# Patient Record
Sex: FEMALE | Race: WHITE | NOT HISPANIC OR LATINO | Employment: OTHER | URBAN - NONMETROPOLITAN AREA
[De-identification: names, ages, dates, MRNs, and addresses within clinical notes are randomized per-mention and may not be internally consistent; named-entity substitution may affect disease eponyms.]

---

## 2024-10-21 ENCOUNTER — OFFICE VISIT (OUTPATIENT)
Dept: URGENT CARE | Facility: CLINIC | Age: 84
End: 2024-10-21
Payer: MEDICARE

## 2024-10-21 VITALS
HEART RATE: 71 BPM | HEIGHT: 61 IN | TEMPERATURE: 97.9 F | DIASTOLIC BLOOD PRESSURE: 70 MMHG | RESPIRATION RATE: 18 BRPM | SYSTOLIC BLOOD PRESSURE: 136 MMHG | WEIGHT: 124 LBS | BODY MASS INDEX: 23.41 KG/M2 | OXYGEN SATURATION: 100 %

## 2024-10-21 DIAGNOSIS — L56.4 POLYMORPHIC LIGHT ERUPTION: ICD-10-CM

## 2024-10-21 DIAGNOSIS — L20.9 ATOPIC DERMATITIS, UNSPECIFIED TYPE: ICD-10-CM

## 2024-10-21 PROCEDURE — 3075F SYST BP GE 130 - 139MM HG: CPT | Performed by: FAMILY MEDICINE

## 2024-10-21 PROCEDURE — 3078F DIAST BP <80 MM HG: CPT | Performed by: FAMILY MEDICINE

## 2024-10-21 PROCEDURE — 99203 OFFICE O/P NEW LOW 30 MIN: CPT | Performed by: FAMILY MEDICINE

## 2024-10-21 RX ORDER — CLOBETASOL PROPIONATE 0.5 MG/ML
SOLUTION TOPICAL
COMMUNITY
Start: 2024-08-29

## 2024-10-21 RX ORDER — LORATADINE 10 MG/1
10 CAPSULE, LIQUID FILLED ORAL DAILY
Qty: 30 CAPSULE | Refills: 1 | Status: SHIPPED | OUTPATIENT
Start: 2024-10-21

## 2024-10-21 RX ORDER — AMLODIPINE BESYLATE 2.5 MG/1
1 TABLET ORAL DAILY
COMMUNITY
Start: 2024-06-07

## 2024-10-21 RX ORDER — MIRTAZAPINE 15 MG/1
TABLET, FILM COATED ORAL
COMMUNITY
Start: 2024-07-12

## 2024-10-21 RX ORDER — PRAVASTATIN SODIUM 40 MG
1 TABLET ORAL DAILY
COMMUNITY
Start: 2024-09-23

## 2024-10-21 RX ORDER — LEVOTHYROXINE SODIUM 50 UG/1
TABLET ORAL
COMMUNITY
Start: 2024-05-01

## 2024-10-21 RX ORDER — ALENDRONATE SODIUM 70 MG/1
TABLET ORAL
COMMUNITY
Start: 2024-05-07

## 2024-10-21 RX ORDER — POLYETHYLENE GLYCOL 3350, SODIUM CHLORIDE, SODIUM BICARBONATE, POTASSIUM CHLORIDE 420; 11.2; 5.72; 1.48 G/4L; G/4L; G/4L; G/4L
POWDER, FOR SOLUTION ORAL
COMMUNITY
Start: 2024-08-02

## 2024-10-21 RX ORDER — DIPHENOXYLATE HYDROCHLORIDE AND ATROPINE SULFATE 2.5; .025 MG/1; MG/1
TABLET ORAL
COMMUNITY
Start: 2024-09-25

## 2024-10-21 RX ORDER — ONDANSETRON 4 MG/1
TABLET, ORALLY DISINTEGRATING ORAL
COMMUNITY
Start: 2024-08-02

## 2024-10-21 RX ORDER — DESOXIMETASONE 2.5 MG/G
CREAM TOPICAL
COMMUNITY
Start: 2024-09-23

## 2024-10-21 RX ORDER — TRIAMCINOLONE ACETONIDE 1 MG/G
1 CREAM TOPICAL 2 TIMES DAILY
Qty: 30 G | Refills: 1 | Status: SHIPPED | OUTPATIENT
Start: 2024-10-21 | End: 2024-11-04

## 2024-10-21 ASSESSMENT — ENCOUNTER SYMPTOMS
NAUSEA: 0
VOMITING: 0
SHORTNESS OF BREATH: 0
COUGH: 0
CHILLS: 0
FEVER: 0
DIZZINESS: 0
SORE THROAT: 0

## 2024-11-01 ENCOUNTER — OFFICE VISIT (OUTPATIENT)
Dept: URGENT CARE | Facility: CLINIC | Age: 84
End: 2024-11-01
Payer: MEDICARE

## 2024-11-01 ENCOUNTER — HOSPITAL ENCOUNTER (OUTPATIENT)
Facility: MEDICAL CENTER | Age: 84
End: 2024-11-01
Attending: NURSE PRACTITIONER
Payer: MEDICARE

## 2024-11-01 VITALS
HEIGHT: 61 IN | DIASTOLIC BLOOD PRESSURE: 70 MMHG | WEIGHT: 124 LBS | HEART RATE: 91 BPM | SYSTOLIC BLOOD PRESSURE: 124 MMHG | BODY MASS INDEX: 23.41 KG/M2 | OXYGEN SATURATION: 95 % | RESPIRATION RATE: 16 BRPM | TEMPERATURE: 97.9 F

## 2024-11-01 DIAGNOSIS — R39.9 UTI SYMPTOMS: ICD-10-CM

## 2024-11-01 DIAGNOSIS — N18.32 STAGE 3B CHRONIC KIDNEY DISEASE: ICD-10-CM

## 2024-11-01 LAB
APPEARANCE UR: CLEAR
BILIRUB UR STRIP-MCNC: NEGATIVE MG/DL
COLOR UR AUTO: YELLOW
GLUCOSE UR STRIP.AUTO-MCNC: NEGATIVE MG/DL
KETONES UR STRIP.AUTO-MCNC: 15 MG/DL
LEUKOCYTE ESTERASE UR QL STRIP.AUTO: NORMAL
NITRITE UR QL STRIP.AUTO: NEGATIVE
PH UR STRIP.AUTO: 5.5 [PH] (ref 5–8)
PROT UR QL STRIP: 100 MG/DL
RBC UR QL AUTO: NORMAL
SP GR UR STRIP.AUTO: 1.02
UROBILINOGEN UR STRIP-MCNC: 0.2 MG/DL

## 2024-11-01 PROCEDURE — 87086 URINE CULTURE/COLONY COUNT: CPT

## 2024-11-01 PROCEDURE — 3074F SYST BP LT 130 MM HG: CPT | Performed by: NURSE PRACTITIONER

## 2024-11-01 PROCEDURE — 81002 URINALYSIS NONAUTO W/O SCOPE: CPT | Performed by: NURSE PRACTITIONER

## 2024-11-01 PROCEDURE — 99214 OFFICE O/P EST MOD 30 MIN: CPT | Performed by: NURSE PRACTITIONER

## 2024-11-01 PROCEDURE — 3078F DIAST BP <80 MM HG: CPT | Performed by: NURSE PRACTITIONER

## 2024-11-01 RX ORDER — NITROFURANTOIN 25; 75 MG/1; MG/1
100 CAPSULE ORAL 2 TIMES DAILY
Qty: 10 CAPSULE | Refills: 0 | Status: SHIPPED | OUTPATIENT
Start: 2024-11-01 | End: 2024-11-06

## 2024-11-01 RX ORDER — CEFDINIR 300 MG/1
CAPSULE ORAL
COMMUNITY
Start: 2024-09-25

## 2024-11-01 NOTE — PROGRESS NOTES
Padmini Berrios is a 84 y.o. female who presents for UTI (Discomfort burning feeling, fever 103, x 1 week)      HPI  This is a new problem. Padmini Berrios is a 84 y.o. patient who presents to urgent care with c/o: Urinary tract symptoms for 1 week.  She has some burning with urination.  She has urgency to urinate.  She has had what frequency over the past few days.  She is visiting her daughter here in the area.  Her daughter notes that she has not drink as much fluids as she usually does or should.  Her daughter had some Macrobid antibiotics that is the same antibiotic that Padmini takes from her doctors when she gets a urinary tract infection and she gave her 1 pill last night and 1 again this morning.  Last night they measured a fever of 103 degrees.  She was treated with some Tylenol.  She has not had a fever since then.  She did felt really cold last night and required an extra blanket on her bed.  She does have a history of stage III kidney disease.  Her daughter reports that they have never had to adjust her medication and always treat her with Macrodantin.  Her last urinary tract infection was in October 2024.  Denies flank pain, nausea, vomiting, diarrhea, weakness.    ROS See HPI    Allergies:       Allergies   Allergen Reactions    Amitriptyline     Misc. Sulfonamide Containing Compounds     Sulfamethoxazole     Trimethoprim        PMSFS Hx:  No past medical history on file.  No past surgical history on file.  No family history on file.  Social History     Tobacco Use    Smoking status: Not on file    Smokeless tobacco: Not on file   Substance Use Topics    Alcohol use: Not on file         Problems:   There is no problem list on file for this patient.      Medications:   Current Outpatient Medications on File Prior to Visit   Medication Sig Dispense Refill    alendronate (FOSAMAX) 70 MG Tab Take  by mouth.      amLODIPine (NORVASC) 2.5 MG Tab Take 1 Tablet by mouth every day.      clobetasol (TEMOVATE) 0.05 %  "external solution       desoximetasone (TOPICORT) 0.25 % Cream APPLY BY TOPICAL ROUTE 2 TIMES A DAY A THIN LAYER TO THE AFFECTED AREA(S); RUB IN GENTLY AND COMPLETELY *MAX 2GM/DAY*      diphenoxylate-atropine (LOMOTIL) 2.5-0.025 MG Tab Take  by mouth.      levothyroxine (SYNTHROID) 50 MCG Tab TAKE 1.5 TABLET M//W/F AND 1 TAB ALL BY ORAL ROUTE EVERY DAY      mirtazapine (REMERON) 15 MG Tab Take  by mouth.      pravastatin (PRAVACHOL) 40 MG tablet Take 1 Tablet by mouth every day.      polyethylene glycol-electrolytes (NULYTELY) 420 GM solution       triamcinolone acetonide (KENALOG) 0.1 % Cream Apply 1 Application topically 2 times a day for 14 days. 30 g 1    cefdinir (OMNICEF) 300 MG Cap  (Patient not taking: Reported on 11/1/2024)      ondansetron (ZOFRAN ODT) 4 MG TABLET DISPERSIBLE  (Patient not taking: Reported on 11/1/2024)      Loratadine (CLARITIN) 10 MG Cap Take 10 mg by mouth every day. (Patient not taking: Reported on 11/1/2024) 30 Capsule 1     No current facility-administered medications on file prior to visit.        Objective:     /70   Pulse 91   Temp 36.6 °C (97.9 °F) (Temporal)   Resp 16   Ht 1.549 m (5' 1\")   Wt 56.2 kg (124 lb)   SpO2 95%   BMI 23.43 kg/m²     Physical Exam  Vitals and nursing note reviewed.   Constitutional:       General: She is not in acute distress.     Appearance: Normal appearance. She is well-developed. She is not ill-appearing or toxic-appearing.   HENT:      Head: Normocephalic.      Mouth/Throat:      Mouth: Mucous membranes are moist.   Cardiovascular:      Rate and Rhythm: Normal rate and regular rhythm.      Pulses: Normal pulses.      Heart sounds: Normal heart sounds.   Pulmonary:      Effort: Pulmonary effort is normal.      Breath sounds: Normal breath sounds.   Abdominal:      Palpations: Abdomen is soft. Abdomen is not rigid.      Tenderness: There is no right CVA tenderness or left CVA tenderness.   Musculoskeletal:      Lumbar back: Normal. "   Skin:     General: Skin is warm and dry.      Capillary Refill: Capillary refill takes less than 2 seconds.   Neurological:      Mental Status: She is alert and oriented to person, place, and time.   Psychiatric:         Mood and Affect: Mood normal.         Behavior: Behavior normal. Behavior is cooperative.       Results for orders placed or performed in visit on 11/01/24   POCT Urinalysis    Collection Time: 11/01/24  1:03 PM   Result Value Ref Range    POC Color yellow Negative    POC Appearance clear Negative    POC Glucose negative Negative mg/dL    POC Bilirubin negative Negative mg/dL    POC Ketones 15 Negative mg/dL    POC Specific Gravity 1.020 <1.005 - >1.030    POC Blood small Negative    POC Urine PH 5.5 5.0 - 8.0    POC Protein 100 Negative mg/dL    POC Urobiligen 0.2 Negative (0.2) mg/dL    POC Nitrites negative Negative    POC Leukocyte Esterase trace Negative     eGFR Jun- 15:00:40 54.3 mL/min 90.0-120.0 L Final     Crea-S Jun- 15:00:40 1.03 mg/dL 0.57-1.11   Final     No renal adjustment indicated today for her Macrobid dosing.  Use of Macrodantin in geriatric patients is documented to be safe in the short-term.  She has been given a 5-day course.  This is the antibiotic that she is normally treated with.  She agrees to stop the antibiotic if her urine culture does not show a bacterial infection.    Assessment /Associated Orders:      1. UTI symptoms  POCT Urinalysis    URINE CULTURE(NEW)    nitrofurantoin (MACROBID) 100 MG Cap            Medical Decision Making:    Padmini is a very pleasant 84 y.o. female who is clinically stable at today's acute urgent care visit.    No acute distress is noted.  VSS. Appropriate for outpatient care at this time.   Acute problem today with uncertain prognosis.  Her urinalysis is suggestive of acute cystitis today however not definitive.  She has been treated with 24 hours of Macrobid already.  She is feeling better.  She has had no fever since her  1 fever last night was documented at 103 degrees.  No systemic signs or symptoms of illness at this time.  I was able to obtain her most recent glomerular filtration rate that was done on June 7, 2024 which was 54.3 mL/min.  Her creatinine at that time was 1.03 mg/dL.  It was noted by her PCP that her renal function has been stable.    Urine culture pending  Stay well-hydrated  Through shared decision making a discussion of the Dx and DDx, management options (risks,benefits, and alternatives to planned treatment), natural progression and supportive care.  Expressed understanding and the treatment plan was agreed upon.   Questions were encouraged and answered   Return to urgent care prn if new or worsening sx or if there is no improvement in condition prn.    Educated in Red flags and indications to immediately call 911 or present to the Emergency Department.             Please note that this dictation was created using voice recognition software. I have worked with consultants from the vendor as well as technical experts from [employer] to optimize the interface. I have made every reasonable attempt to correct obvious errors, but I expect that there are errors of grammar and possibly content that I did not discover before finalizing the note.  This note was electronically signed by provider

## 2024-11-04 LAB
BACTERIA UR CULT: NORMAL
SIGNIFICANT IND 70042: NORMAL
SITE SITE: NORMAL
SOURCE SOURCE: NORMAL